# Patient Record
Sex: FEMALE | Race: WHITE
[De-identification: names, ages, dates, MRNs, and addresses within clinical notes are randomized per-mention and may not be internally consistent; named-entity substitution may affect disease eponyms.]

---

## 2018-05-07 ENCOUNTER — HOSPITAL ENCOUNTER (OUTPATIENT)
Age: 63
End: 2018-05-07
Payer: COMMERCIAL

## 2018-05-07 DIAGNOSIS — M79.605: Primary | ICD-10-CM

## 2018-05-07 PROCEDURE — 93971 EXTREMITY STUDY: CPT

## 2018-06-12 ENCOUNTER — HOSPITAL ENCOUNTER (OUTPATIENT)
Age: 63
End: 2018-06-12
Payer: COMMERCIAL

## 2018-06-12 DIAGNOSIS — Z12.31: Primary | ICD-10-CM

## 2018-06-12 PROCEDURE — 77067 SCR MAMMO BI INCL CAD: CPT

## 2018-06-27 ENCOUNTER — HOSPITAL ENCOUNTER (OUTPATIENT)
Age: 63
End: 2018-06-27
Payer: COMMERCIAL

## 2018-06-27 DIAGNOSIS — R92.8: Primary | ICD-10-CM

## 2018-06-27 PROCEDURE — 77065 DX MAMMO INCL CAD UNI: CPT

## 2019-02-15 ENCOUNTER — HOSPITAL ENCOUNTER (OUTPATIENT)
Age: 64
End: 2019-02-15
Payer: COMMERCIAL

## 2019-02-15 DIAGNOSIS — M79.662: Primary | ICD-10-CM

## 2019-02-15 PROCEDURE — 93971 EXTREMITY STUDY: CPT

## 2020-09-21 ENCOUNTER — HOSPITAL ENCOUNTER (OUTPATIENT)
Age: 65
End: 2020-09-21
Payer: MEDICARE

## 2020-09-21 DIAGNOSIS — S46.212A: Primary | ICD-10-CM

## 2020-09-21 PROCEDURE — 73221 MRI JOINT UPR EXTREM W/O DYE: CPT

## 2020-11-27 ENCOUNTER — HOSPITAL ENCOUNTER (OUTPATIENT)
Age: 65
End: 2020-11-27
Payer: MEDICARE

## 2020-11-27 DIAGNOSIS — Z03.818: Primary | ICD-10-CM

## 2020-11-27 PROCEDURE — U0003 INFECTIOUS AGENT DETECTION BY NUCLEIC ACID (DNA OR RNA); SEVERE ACUTE RESPIRATORY SYNDROME CORONAVIRUS 2 (SARS-COV-2) (CORONAVIRUS DISEASE [COVID-19]), AMPLIFIED PROBE TECHNIQUE, MAKING USE OF HIGH THROUGHPUT TECHNOLOGIES AS DESCRIBED BY CMS-2020-01-R: HCPCS

## 2021-01-27 ENCOUNTER — OFFICE VISIT (OUTPATIENT)
Dept: OBSTETRICS AND GYNECOLOGY | Facility: CLINIC | Age: 66
End: 2021-01-27

## 2021-01-27 VITALS — TEMPERATURE: 97.3 F | WEIGHT: 147 LBS | DIASTOLIC BLOOD PRESSURE: 70 MMHG | SYSTOLIC BLOOD PRESSURE: 118 MMHG

## 2021-01-27 DIAGNOSIS — N81.9 FEMALE GENITAL PROLAPSE, UNSPECIFIED TYPE: Primary | ICD-10-CM

## 2021-01-27 PROCEDURE — 99212 OFFICE O/P EST SF 10 MIN: CPT | Performed by: NURSE PRACTITIONER

## 2021-01-27 NOTE — PROGRESS NOTES
Chief Complaint   Patient presents with   • Follow-up       Subjective   HPI  Britany Mcrae is a 65 y.o. female, No obstetric history on file., who presents for pessory removal. She usually takes it out herself every 1-2 weeks to clean it, but this time she could not get it out. She is not  having any problems.    Additional OB/GYN History       Health Maintenance   Topic Date Due   • MAMMOGRAM  1955   • COLONOSCOPY  1955   • TDAP/TD VACCINES (1 - Tdap) 11/09/1973   • ZOSTER VACCINE (1 of 2) 11/09/2004   • Pneumococcal Vaccine 65+ (1 of 1 - PPSV23) 11/09/2019   • INFLUENZA VACCINE  08/01/2020   • HEPATITIS C SCREENING  01/27/2021   • ANNUAL WELLNESS VISIT  01/27/2021   • MENINGOCOCCAL VACCINE  Aged Out       The additional following portions of the patient's history were reviewed and updated as appropriate: allergies, current medications, past family history, past medical history, past social history, past surgical history and problem list.    Review of Systems   Constitutional: Negative for fatigue and fever.   Respiratory: Negative for cough and shortness of breath.    Cardiovascular: Negative for chest pain and leg swelling.   Gastrointestinal: Negative.    Genitourinary: Negative.    Musculoskeletal: Negative for back pain and gait problem.   Neurological: Negative for dizziness and headache.   All other systems reviewed and are negative.      I have reviewed and agree with the HPI, ROS, and historical information as entered above. Aliya Zheng, APRN    Objective   /70   Temp 97.3 °F (36.3 °C)   Wt 66.7 kg (147 lb)     Physical Exam  Vitals signs and nursing note reviewed. Exam conducted with a chaperone present.   Constitutional:       Appearance: Normal appearance.   Pulmonary:      Effort: Pulmonary effort is normal.   Abdominal:      General: There is no distension.      Palpations: Abdomen is soft. There is no mass.      Tenderness: There is no abdominal tenderness. There is no guarding  or rebound.      Hernia: No hernia is present.   Genitourinary:     General: Normal vulva.      Comments: Pessary removed without difficulty.  Cleaned and given to pt.  She will reinsert herself.  Pt tolerated well.  Skin:     General: Skin is warm and dry.   Neurological:      Mental Status: She is alert and oriented to person, place, and time.   Psychiatric:         Mood and Affect: Mood normal.         Behavior: Behavior normal.         Assessment/Plan     Assessment     Problem List Items Addressed This Visit     None      Visit Diagnoses     Female genital prolapse, unspecified type    -  Primary          Plan     Call prn problems.  PRAFUL as directed and prn.      Aliya Zheng, APRN  01/27/2021

## 2021-09-07 ENCOUNTER — HOSPITAL ENCOUNTER (OUTPATIENT)
Age: 66
End: 2021-09-07
Payer: MEDICARE

## 2021-09-07 DIAGNOSIS — Z51.81: ICD-10-CM

## 2021-09-07 DIAGNOSIS — R82.90: ICD-10-CM

## 2021-09-07 DIAGNOSIS — Z79.82: ICD-10-CM

## 2021-09-07 DIAGNOSIS — Z01.818: Primary | ICD-10-CM

## 2021-09-07 LAB
ANION GAP SERPL CALC-SCNC: 12.5 MEQ/L (ref 5–15)
APTT PPP: 26.6 SECONDS (ref 22.8–30.6)
BACTERIA URNS QL MICRO: (no result) /LPF
BUN SERPL-MCNC: 16 MG/DL (ref 7–17)
CALCIUM SPEC-MCNC: 9 MG/DL (ref 8.4–10.2)
CHLORIDE SPEC-SCNC: 106 MMOL/L (ref 98–107)
CO2 SERPL-SCNC: 27 MMOL/L (ref 22–30)
COLOR UR: YELLOW
CREAT BLD-SCNC: 0.7 MG/DL (ref 0.52–1.04)
ESTIMATED GLOMERULAR FILT RATE: 84 ML/MIN (ref 60–?)
GFR (AFRICAN AMERICAN): 101 ML/MIN (ref 60–?)
GLUCOSE: 100 MG/DL (ref 74–100)
HCT VFR BLD CALC: 43.6 % (ref 37–47)
HGB BLD-MCNC: 13.9 G/DL (ref 12.2–16.2)
INR PPP: 0.89 (ref 0.9–1.1)
LEUKOCYTE ESTERASE UR QL STRIP: (no result)
MCHC RBC-ENTMCNC: 31.9 G/DL (ref 31.8–35.4)
MCV RBC: 94.2 FL (ref 81–99)
MEAN CORPUSCULAR HEMOGLOBIN: 30 PG (ref 27–31.2)
MICRO URNS: (no result)
PH UR: 7.5 [PH] (ref 5–8.5)
PLATELET # BLD: 271 K/MM3 (ref 142–424)
POTASSIUM: 4.5 MMOL/L (ref 3.5–5.1)
PT BLD: 10.6 SECONDS (ref 10.1–12.5)
RBC # BLD AUTO: 4.63 M/MM3 (ref 4.2–5.4)
RBC #/AREA URNS HPF: (no result) #/HPF (ref 0–3)
SODIUM SPEC-SCNC: 141 MMOL/L (ref 136–145)
SP GR UR: 1.01 (ref 1–1.03)
SQUAMOUS URNS QL MICRO: (no result) #/HPF (ref 0–5)
UROBILINOGEN UR QL: 0.2 EU/DL
WBC # BLD AUTO: 5.5 K/MM3 (ref 4.8–10.8)
WBC # UR: (no result) #/HPF (ref 0–3)

## 2021-09-07 PROCEDURE — 80048 BASIC METABOLIC PNL TOTAL CA: CPT

## 2021-09-07 PROCEDURE — 85025 COMPLETE CBC W/AUTO DIFF WBC: CPT

## 2021-09-07 PROCEDURE — 71046 X-RAY EXAM CHEST 2 VIEWS: CPT

## 2021-09-07 PROCEDURE — 85730 THROMBOPLASTIN TIME PARTIAL: CPT

## 2021-09-07 PROCEDURE — 85610 PROTHROMBIN TIME: CPT

## 2021-09-07 PROCEDURE — 93005 ELECTROCARDIOGRAM TRACING: CPT

## 2021-09-07 PROCEDURE — 87086 URINE CULTURE/COLONY COUNT: CPT

## 2021-09-07 PROCEDURE — 87088 URINE BACTERIA CULTURE: CPT

## 2021-09-07 PROCEDURE — 36415 COLL VENOUS BLD VENIPUNCTURE: CPT

## 2021-09-07 PROCEDURE — 81001 URINALYSIS AUTO W/SCOPE: CPT

## 2023-02-07 ENCOUNTER — HOSPITAL ENCOUNTER (OUTPATIENT)
Age: 68
End: 2023-02-07
Payer: MEDICARE

## 2023-02-07 DIAGNOSIS — Z12.31: ICD-10-CM

## 2023-02-07 DIAGNOSIS — R00.1: Primary | ICD-10-CM

## 2023-02-07 DIAGNOSIS — Z78.0: ICD-10-CM

## 2023-02-07 LAB
ALBUMIN LEVEL: 4.2 G/DL (ref 3.5–5)
ALBUMIN/GLOB SERPL: 1.7 {RATIO} (ref 1.1–1.8)
ALP ISO SERPL-ACNC: 84 U/L (ref 38–126)
ALT SERPLBLD-CCNC: 14 U/L (ref 12–78)
ANION GAP SERPL CALC-SCNC: 9.5 MEQ/L (ref 5–15)
AST SERPL QL: 23 U/L (ref 14–36)
BILIRUBIN,TOTAL: 0.9 MG/DL (ref 0.2–1.3)
BUN SERPL-MCNC: 14 MG/DL (ref 7–17)
CALCIUM SPEC-MCNC: 9 MG/DL (ref 8.4–10.2)
CHLORIDE SPEC-SCNC: 107 MMOL/L (ref 98–107)
CHOLEST SPEC-SCNC: 162 MG/DL (ref 140–200)
CO2 SERPL-SCNC: 29 MMOL/L (ref 22–30)
CREAT BLD-SCNC: 0.7 MG/DL (ref 0.52–1.04)
ESTIMATED GLOMERULAR FILT RATE: 83 ML/MIN (ref 60–?)
GFR (AFRICAN AMERICAN): 101 ML/MIN (ref 60–?)
GLOBULIN SER CALC-MCNC: 2.5 G/DL (ref 1.3–3.2)
GLUCOSE: 95 MG/DL (ref 74–100)
HCT VFR BLD CALC: 42.4 % (ref 37–47)
HDLC SERPL-MCNC: 56 MG/DL (ref 40–60)
HGB BLD-MCNC: 13.9 G/DL (ref 12.2–16.2)
MCHC RBC-ENTMCNC: 32.7 G/DL (ref 31.8–35.4)
MCV RBC: 90.9 FL (ref 81–99)
MEAN CORPUSCULAR HEMOGLOBIN: 29.8 PG (ref 27–31.2)
PLATELET # BLD: 295 K/MM3 (ref 142–424)
POTASSIUM: 4.5 MMOL/L (ref 3.5–5.1)
PROT SERPL-MCNC: 6.7 G/DL (ref 6.3–8.2)
RBC # BLD AUTO: 4.67 M/MM3 (ref 4.2–5.4)
SODIUM SPEC-SCNC: 141 MMOL/L (ref 136–145)
TRIGLYCERIDES: 94 MG/DL (ref 30–150)
TSH SERPL-ACNC: 4.24 UIU/ML (ref 0.47–4.68)
WBC # BLD AUTO: 7.4 K/MM3 (ref 4.8–10.8)

## 2023-02-07 PROCEDURE — 77067 SCR MAMMO BI INCL CAD: CPT

## 2023-02-07 PROCEDURE — 77080 DXA BONE DENSITY AXIAL: CPT

## 2023-02-07 PROCEDURE — 85025 COMPLETE CBC W/AUTO DIFF WBC: CPT

## 2023-02-07 PROCEDURE — 36415 COLL VENOUS BLD VENIPUNCTURE: CPT

## 2023-02-07 PROCEDURE — 84443 ASSAY THYROID STIM HORMONE: CPT

## 2023-02-07 PROCEDURE — 80061 LIPID PANEL: CPT

## 2023-02-07 PROCEDURE — 80053 COMPREHEN METABOLIC PANEL: CPT

## 2023-02-07 PROCEDURE — 77063 BREAST TOMOSYNTHESIS BI: CPT

## 2024-07-17 ENCOUNTER — HOSPITAL ENCOUNTER (OUTPATIENT)
Age: 69
Discharge: HOME | End: 2024-07-17
Payer: MEDICARE

## 2024-07-17 DIAGNOSIS — Z01.818: Primary | ICD-10-CM

## 2024-07-17 PROCEDURE — 71046 X-RAY EXAM CHEST 2 VIEWS: CPT

## 2024-07-17 PROCEDURE — 93005 ELECTROCARDIOGRAM TRACING: CPT

## 2024-09-30 ENCOUNTER — HOSPITAL ENCOUNTER (OUTPATIENT)
Dept: HOSPITAL 22 - PT | Age: 69
Discharge: HOME | End: 2024-09-30
Payer: MEDICARE

## 2024-09-30 DIAGNOSIS — M17.12: Primary | ICD-10-CM

## 2024-09-30 PROCEDURE — 97016 VASOPNEUMATIC DEVICE THERAPY: CPT

## 2024-09-30 PROCEDURE — 97110 THERAPEUTIC EXERCISES: CPT

## 2024-09-30 PROCEDURE — 97014 ELECTRIC STIMULATION THERAPY: CPT

## 2024-09-30 PROCEDURE — 97140 MANUAL THERAPY 1/> REGIONS: CPT

## 2024-09-30 PROCEDURE — G0283 ELEC STIM OTHER THAN WOUND: HCPCS

## 2024-09-30 PROCEDURE — 97163 PT EVAL HIGH COMPLEX 45 MIN: CPT

## 2024-09-30 PROCEDURE — 97164 PT RE-EVAL EST PLAN CARE: CPT
